# Patient Record
Sex: FEMALE | Race: WHITE | NOT HISPANIC OR LATINO | Employment: FULL TIME | URBAN - METROPOLITAN AREA
[De-identification: names, ages, dates, MRNs, and addresses within clinical notes are randomized per-mention and may not be internally consistent; named-entity substitution may affect disease eponyms.]

---

## 2023-01-25 ENCOUNTER — OFFICE VISIT (OUTPATIENT)
Dept: NEUROSURGERY | Facility: CLINIC | Age: 58
End: 2023-01-25
Payer: COMMERCIAL

## 2023-01-25 VITALS
HEIGHT: 62 IN | DIASTOLIC BLOOD PRESSURE: 70 MMHG | WEIGHT: 126.4 LBS | SYSTOLIC BLOOD PRESSURE: 124 MMHG | BODY MASS INDEX: 23.26 KG/M2

## 2023-01-25 DIAGNOSIS — M54.17 LUMBOSACRAL RADICULOPATHY AT S1: Primary | ICD-10-CM

## 2023-01-25 PROCEDURE — 99204 OFFICE O/P NEW MOD 45 MIN: CPT | Performed by: NEUROLOGICAL SURGERY

## 2023-01-25 RX ORDER — GABAPENTIN 300 MG/1
300 CAPSULE ORAL 3 TIMES DAILY
Qty: 90 CAPSULE | Refills: 1 | Status: SHIPPED | OUTPATIENT
Start: 2023-01-25 | End: 2023-03-28

## 2023-01-25 RX ORDER — ONDANSETRON 4 MG/1
TABLET, ORALLY DISINTEGRATING ORAL
COMMUNITY
Start: 2022-12-13

## 2023-01-25 RX ORDER — SERTRALINE HYDROCHLORIDE 25 MG/1
25 TABLET, FILM COATED ORAL DAILY
COMMUNITY
Start: 2023-01-05

## 2023-01-25 RX ORDER — CYCLOBENZAPRINE HCL 10 MG
10 TABLET ORAL NIGHTLY PRN
COMMUNITY
Start: 2023-01-06

## 2023-01-25 RX ORDER — DOXYCYCLINE HYCLATE 150 MG/1
1 TABLET ORAL DAILY
COMMUNITY
Start: 2022-12-27

## 2023-01-25 NOTE — PROGRESS NOTES
Subjective     Chief Complaint: Back pain    Patient ID: Trisha TELLEZ is a 57 y.o. female seen for consultation today at the request of  Kandy Monk MD    History of Present Illness    This is a 57-year-old woman who presents to my office with an approximately 3-4-month history of left buttock and leg dysesthetic pain.  Her symptoms started without antecedent trauma.  She has tried some home exercises consisting of stretches which have not helped all that much.  She is not taking any prescription medications for this problem.  She has not tried any physical therapy or pain management.    She does not have much in the way of medical comorbidities.  She does not smoke or drink alcohol.    The following portions of the patient's history were reviewed and updated as appropriate: allergies, current medications, past family history, past medical history, past social history, past surgical history and problem list.    Family history:   Family History   Problem Relation Age of Onset   • Hypertension Mother    • Arthritis Mother    • Hypertension Father    • Cancer Father    • Hypertension Brother    • Cancer Paternal Aunt    • Heart disease Maternal Grandmother    • Diabetes Maternal Grandmother    • Arthritis Maternal Grandmother        Social history:   Social History     Socioeconomic History   • Marital status:    Tobacco Use   • Smoking status: Former     Packs/day: 0.25     Types: Cigarettes     Quit date: 2016     Years since quittin.0   Substance and Sexual Activity   • Alcohol use: Yes     Alcohol/week: 5.0 standard drinks     Types: 5 Glasses of wine per week   • Drug use: Yes     Types: Marijuana     Comment: 1/day       Review of Systems   Constitutional: Positive for appetite change and fatigue.   Gastrointestinal: Positive for nausea.   Musculoskeletal: Positive for back pain and neck pain.   Skin: Positive for color change.   Allergic/Immunologic: Positive for environmental allergies.  "  Neurological: Positive for numbness.       Objective   Blood pressure 124/70, height 157.5 cm (62\"), weight 57.3 kg (126 lb 6.4 oz).  Body mass index is 23.12 kg/m².    Physical Exam  Vitals and nursing note reviewed.   Constitutional:       Appearance: She is well-developed. She is not toxic-appearing.   HENT:      Head: Normocephalic and atraumatic.      Right Ear: Hearing normal.      Left Ear: Hearing normal.      Nose: Nose normal.   Eyes:      General: Lids are normal.      Conjunctiva/sclera: Conjunctivae normal.      Pupils: Pupils are equal, round, and reactive to light.   Neck:      Vascular: No JVD.   Cardiovascular:      Rate and Rhythm: Normal rate and regular rhythm.      Pulses:           Radial pulses are 2+ on the right side and 2+ on the left side.   Pulmonary:      Effort: Pulmonary effort is normal. No respiratory distress.      Breath sounds: No stridor. No wheezing.   Musculoskeletal:      Cervical back: Normal range of motion.      Lumbar back: Negative right straight leg raise test and negative left straight leg raise test.      Comments: She does have some discomfort in her left lower back with the straight leg raise test at about 35 degrees , However there is no reproduction of the sciatica type pain.   Skin:     General: Skin is warm and dry.      Findings: No erythema or rash.   Neurological:      Mental Status: She is alert and oriented to person, place, and time.      GCS: GCS eye subscore is 4. GCS verbal subscore is 5. GCS motor subscore is 6.      Cranial Nerves: No cranial nerve deficit.      Motor: No abnormal muscle tone.      Deep Tendon Reflexes: Reflexes abnormal.      Reflex Scores:       Patellar reflexes are 1+ on the right side and 1+ on the left side.       Achilles reflexes are 0 on the right side and 0 on the left side.     Comments: Ankle jerks are absent bilaterally    Heel raise toe raise gait are performed unremarkably although she does have somewhat of an antalgic " casual gait.   Psychiatric:         Behavior: Behavior normal.         Thought Content: Thought content normal.         Judgment: Judgment normal.         Assessment & Plan     Independent Review of Radiographic Studies:      Available for my review is a MRI of the lumbar spine which was performed on 12/28/2022.  This demonstrates diffuse degenerative disc disease which for the most part is mild.  Lumbar lordosis is decreased, and there has been significant fatty replacement of the paraspinous musculature.  At L5-S1, there is a broad-based disc herniation which for the most part is paracentral, however there does appear to be a component of leftward a centricity and some contacting of the descending S1 nerve root.  This could reasonably be expected to be contributing to a left S1 radiculopathy.  There are some early Modic endplate changes noted at L5-S1 suggestive of a component of some chronic instability.  The central canal overall is capacious and I do not appreciate any other obvious foci of significant lateral recess or intraforaminal stenosis.    Medical Decision Making:      This is a 57-year-old woman with a subacute presentation of a left S1 radiculopathy.  She is a candidate for a left L5-S1 microdiscectomy and I discussed the risks and benefits of this procedure with her.  All questions were answered.  No guarantees were given or implied.  I reviewed the pertinent anatomy with the aid of a 3-dimensional model of the spine.    She opted for conservative treatment which I think is totally reasonable.  I have given her a prescription for some gabapentin and I will arrange for a left epidural steroid injection at L5-S1.  I have also referred her for physical therapy.  I will follow-up with her in about 6 weeks, or sooner if clinically indicated.    Diagnoses and all orders for this visit:    1. Lumbosacral radiculopathy at S1 (Primary)  -     Ambulatory Referral to Physical Therapy Evaluate and treat; Soft  Tissue Mobilizaton; Strengthening, Stretching  -     Ambulatory Referral to Pain Management  -     gabapentin (NEURONTIN) 300 MG capsule; Take 1 capsule by mouth 3 (Three) Times a Day.  Dispense: 90 capsule; Refill: 1        No follow-ups on file.           This document signed by YARED Lacey MD January 25, 2023 10:19 EST

## 2023-03-10 ENCOUNTER — OFFICE VISIT (OUTPATIENT)
Dept: NEUROSURGERY | Facility: CLINIC | Age: 58
End: 2023-03-10
Payer: COMMERCIAL

## 2023-03-10 VITALS
SYSTOLIC BLOOD PRESSURE: 118 MMHG | HEIGHT: 62 IN | BODY MASS INDEX: 23.37 KG/M2 | DIASTOLIC BLOOD PRESSURE: 64 MMHG | WEIGHT: 127 LBS

## 2023-03-10 DIAGNOSIS — M54.17 LUMBOSACRAL RADICULOPATHY AT S1: Primary | ICD-10-CM

## 2023-03-10 DIAGNOSIS — M51.27 HERNIATED NUCLEUS PULPOSUS, L5-S1, LEFT: ICD-10-CM

## 2023-03-10 PROCEDURE — 99213 OFFICE O/P EST LOW 20 MIN: CPT | Performed by: NEUROLOGICAL SURGERY

## 2023-03-10 NOTE — PROGRESS NOTES
"Subjective     Chief Complaint: S1 radiculopathy    Patient ID: Trisha TELLEZ is a 57 y.o. female is here today for follow-up.    History of Present Illness    This is a 57-year-old woman who I saw in consultation in January for a subacute presentation of an S1 radiculopathy.  I offered her surgical decompression, however she opted for conservative treatment which I felt was reasonable at that time.  She presents today for routine follow-up.    The following portions of the patient's history were reviewed and updated as appropriate: allergies, current medications, past family history, past medical history, past social history, past surgical history and problem list.    Family history:   Family History   Problem Relation Age of Onset   • Hypertension Mother    • Arthritis Mother    • Hypertension Father    • Cancer Father    • Hypertension Brother    • Cancer Paternal Aunt    • Heart disease Maternal Grandmother    • Diabetes Maternal Grandmother    • Arthritis Maternal Grandmother        Social history:   Social History     Socioeconomic History   • Marital status:    Tobacco Use   • Smoking status: Former     Packs/day: 0.25     Types: Cigarettes     Quit date:      Years since quittin.1   Substance and Sexual Activity   • Alcohol use: Yes     Alcohol/week: 5.0 standard drinks     Types: 5 Glasses of wine per week   • Drug use: Yes     Types: Marijuana     Comment: 1/day       Review of Systems   Constitutional: Positive for appetite change and fatigue.   Gastrointestinal: Positive for nausea.   Musculoskeletal: Positive for back pain and neck pain.   Skin: Positive for color change.   Allergic/Immunologic: Positive for environmental allergies.   Neurological: Positive for numbness.       Objective   Blood pressure 118/64, height 157.5 cm (62\"), weight 57.6 kg (127 lb).  Body mass index is 23.23 kg/m².    Physical Exam  Constitutional:       General: She is not in acute distress.     Appearance: " She is well-developed. She is not diaphoretic.   HENT:      Head: Normocephalic and atraumatic.   Pulmonary:      Effort: Pulmonary effort is normal.   Skin:     General: Skin is warm and dry.   Neurological:      Mental Status: She is alert and oriented to person, place, and time.      Cranial Nerves: No cranial nerve deficit.         Assessment & Plan     Independent Review of Radiographic Studies:      She has no new imaging for me to review    Medical Decision Making:      I reviewed the signs and symptoms of lumbosacral radiculopathy with her.  I directed her to contact my office with new or worsening symptoms.  I will follow-up with her in about 8 weeks, or sooner if clinically indicated.  For the time being, her symptoms are reasonably well controlled with physical therapy and gabapentin.  My intention is certainly not to keep her on gabapentin indefinitely, so we can discuss a protocol for weaning her down in the next 3 months assuming that her symptoms continue to improve.    Diagnoses and all orders for this visit:    1. Left S1 radiculopathy (Primary)    2. Herniated nucleus pulposus, L5-S1, left        No follow-ups on file.           This document signed by YARED Lacey MD March 10, 2023 10:17 EST

## 2023-03-26 DIAGNOSIS — M54.17 LUMBOSACRAL RADICULOPATHY AT S1: ICD-10-CM

## 2023-03-28 RX ORDER — GABAPENTIN 300 MG/1
CAPSULE ORAL
Qty: 90 CAPSULE | Refills: 3 | Status: SHIPPED | OUTPATIENT
Start: 2023-03-28

## 2023-03-28 NOTE — TELEPHONE ENCOUNTER
"Provider:  Abhijit  Surgery/Procedure:  ---  Surgery/Procedure Date:  --  Last visit:   Office Visit with Mina Lacey MD (03/10/2023)    Next visit: Appointment with Mina Lacey MD (05/12/2023)       Reason for call:    Requested Prescriptions     Pending Prescriptions Disp Refills   • gabapentin (NEURONTIN) 300 MG capsule [Pharmacy Med Name: GABAPENTIN 300MG CAPSULES] 90 capsule      Sig: TAKE 1 CAPSULE BY MOUTH THREE TIMES DAILY         \"For the time being, her symptoms are reasonably well controlled with physical therapy and gabapentin.  My intention is certainly not to keep her on gabapentin indefinitely, so we can discuss a protocol for weaning her down in the next 3 months assuming that her symptoms \"       Last ov note on 3/10/23^      03/16/2023 [id number] Hydrocodone Bitartrate/Ac 325MG/5MG Jayce, Griselda L [facility] 10 5 10 04 [place]  Date Written New/Refill Dosage Form Prescriber [facility]  03/16/2023 New TABS [facility] [place]  Pat ID Date Filled RX # Drug Name Prescriber Name Dispenser Name Qty Days MED PMT Rpt To  1 03/14/2023 [id number] Triazolam 0.25MG Jayce, Griselda L [facility] 3 2 04 [place]  Date Written New/Refill Dosage Form Prescriber [facility]  03/14/2023 New TABS [facility] [place]  Pat ID Date Filled RX # Drug Name Prescriber Name Dispenser Name Qty Days MED PMT Rpt To  1 02/24/2023 [id number] Gabapentin 300MG Mina Lacey [facility] 90 30 04 [place]  Date Written New/Refill Dosage Form Prescriber [facility]  01/25/2023 Refill CAPS [facility] [place]  Pat ID Date Filled RX # Drug Name Prescriber Name Dispenser Name Qty Days MED PMT Rpt To  1 01/25/2023 [id number] Gabapentin 300MG Mina Lacey [facility] [address] 30 04 [place]      "

## 2023-05-12 ENCOUNTER — OFFICE VISIT (OUTPATIENT)
Dept: NEUROSURGERY | Facility: CLINIC | Age: 58
End: 2023-05-12
Payer: COMMERCIAL

## 2023-05-12 VITALS — HEIGHT: 62 IN | WEIGHT: 126.4 LBS | TEMPERATURE: 97.7 F | BODY MASS INDEX: 23.26 KG/M2

## 2023-05-12 DIAGNOSIS — M54.17 LUMBOSACRAL RADICULOPATHY AT S1: Primary | ICD-10-CM

## 2023-05-12 DIAGNOSIS — M51.27 HERNIATED NUCLEUS PULPOSUS, L5-S1, LEFT: ICD-10-CM

## 2023-05-12 PROBLEM — M79.89 MASS OF SOFT TISSUE: Status: ACTIVE | Noted: 2022-05-12

## 2023-05-12 PROBLEM — G93.5 CHIARI MALFORMATION TYPE I: Status: ACTIVE | Noted: 2023-05-12

## 2023-05-12 PROBLEM — M85.80 OSTEOPENIA: Status: ACTIVE | Noted: 2023-05-12

## 2023-05-12 PROBLEM — J30.9 ALLERGIC RHINITIS: Status: ACTIVE | Noted: 2023-05-12

## 2023-05-12 PROBLEM — K21.9 GASTROESOPHAGEAL REFLUX DISEASE: Status: ACTIVE | Noted: 2023-05-12

## 2023-05-12 PROCEDURE — 99214 OFFICE O/P EST MOD 30 MIN: CPT | Performed by: NEUROLOGICAL SURGERY

## 2023-05-12 NOTE — H&P (VIEW-ONLY)
Subjective     Chief Complaint: Lumbosacral radiculopathy    Patient ID: Trisha TELLEZ is a 58 y.o. female is here today for follow-up.    History of Present Illness    This is a 58-year-old woman with a subacute presentation of S1 radiculopathy.  I offered her surgical intervention but she opted for conservative treatment.  She reports that her symptoms are being adequately controlled with gabapentin.  Furthermore, she reports that she does not really have time to undergo surgery at this point.    The following portions of the patient's history were reviewed and updated as appropriate: allergies, current medications, past family history, past medical history, past social history, past surgical history and problem list.    Family history:   Family History   Problem Relation Age of Onset   • Hypertension Mother    • Arthritis Mother    • Hypertension Father    • Cancer Father    • Hypertension Brother    • Cancer Paternal Aunt    • Heart disease Maternal Grandmother    • Diabetes Maternal Grandmother    • Arthritis Maternal Grandmother        Social history:   Social History     Socioeconomic History   • Marital status:    Tobacco Use   • Smoking status: Former     Packs/day: 0.25     Types: Cigarettes     Quit date:      Years since quittin.3   Substance and Sexual Activity   • Alcohol use: Yes     Alcohol/week: 5.0 standard drinks     Types: 5 Glasses of wine per week   • Drug use: Yes     Types: Marijuana     Comment: 1/day       Review of Systems   Constitutional: Negative for activity change, appetite change, chills, diaphoresis, fatigue, fever and unexpected weight change.   HENT: Negative for congestion, dental problem, drooling, ear discharge, ear pain, facial swelling, hearing loss, mouth sores, nosebleeds, postnasal drip, rhinorrhea, sinus pressure, sneezing, sore throat, tinnitus, trouble swallowing and voice change.    Eyes: Negative for photophobia, pain, discharge, redness, itching and  "visual disturbance.   Respiratory: Negative for apnea, cough, choking, chest tightness, shortness of breath, wheezing and stridor.    Cardiovascular: Negative for chest pain, palpitations and leg swelling.   Gastrointestinal: Negative for abdominal distention, abdominal pain, anal bleeding, blood in stool, constipation, diarrhea, nausea, rectal pain and vomiting.   Endocrine: Negative for cold intolerance, heat intolerance, polydipsia, polyphagia and polyuria.   Genitourinary: Negative for decreased urine volume, difficulty urinating, dysuria, enuresis, flank pain, frequency, genital sores, hematuria and urgency.   Musculoskeletal: Positive for back pain and joint swelling. Negative for arthralgias, gait problem, myalgias, neck pain and neck stiffness.   Skin: Negative for color change, pallor, rash and wound.   Allergic/Immunologic: Negative for environmental allergies, food allergies and immunocompromised state.   Neurological: Negative for dizziness, tremors, seizures, syncope, facial asymmetry, speech difficulty, weakness, light-headedness, numbness and headaches.   Hematological: Negative for adenopathy. Does not bruise/bleed easily.   Psychiatric/Behavioral: Negative for agitation, behavioral problems, confusion, decreased concentration, dysphoric mood, hallucinations, self-injury, sleep disturbance and suicidal ideas. The patient is not nervous/anxious and is not hyperactive.    All other systems reviewed and are negative.      Objective   Temperature 97.7 °F (36.5 °C), temperature source Infrared, height 157.5 cm (62\"), weight 57.3 kg (126 lb 6.4 oz).  Body mass index is 23.12 kg/m².    Physical Exam  Constitutional:       General: She is not in acute distress.     Appearance: She is well-developed. She is not diaphoretic.   HENT:      Head: Normocephalic and atraumatic.   Pulmonary:      Effort: Pulmonary effort is normal.   Skin:     General: Skin is warm and dry.   Neurological:      Mental Status: She is " alert and oriented to person, place, and time.      Cranial Nerves: No cranial nerve deficit.         Assessment & Plan     Independent Review of Radiographic Studies:      She has no new imaging for me to review.  I did we review the MRI of her lumbar spine from December 2022.  There is an extruded fragment at L5-S1 on the left side which is compressing the descending S1 nerve root.  There is evidence of a prior laminotomy on the right side as well.    Medical Decision Making:      For the time being her symptoms are reasonably well controlled with Neurontin.  She is not interested in pursuing surgery at this point so we can maintain her for a while longer.  If at any point she decides that she would like to proceed with surgery I have obtained informed consent with her on multiple occasions at this point.  I will have her follow-up with me at the end of the summer if she would like and we can discuss what further management options she might have, but it sounds like for the time being she is content to manage her pain symptomatically with her current dose of gabapentin.  I will follow-up with her as needed.    Diagnoses and all orders for this visit:    1. Lumbosacral radiculopathy at S1 (Primary)    2. Herniated nucleus pulposus, L5-S1, left        Return if symptoms worsen or fail to improve.           This document signed by YARED Lacey MD May 12, 2023 14:37 EDT

## 2023-05-23 ENCOUNTER — PRE-ADMISSION TESTING (OUTPATIENT)
Dept: PREADMISSION TESTING | Facility: HOSPITAL | Age: 58
End: 2023-05-23
Payer: COMMERCIAL

## 2023-05-23 VITALS — WEIGHT: 123.68 LBS | BODY MASS INDEX: 22.76 KG/M2 | HEIGHT: 62 IN

## 2023-05-23 DIAGNOSIS — M54.17 LUMBOSACRAL RADICULOPATHY AT S1: ICD-10-CM

## 2023-05-23 DIAGNOSIS — M51.27 HERNIATED NUCLEUS PULPOSUS, L5-S1, LEFT: ICD-10-CM

## 2023-05-23 LAB
ANION GAP SERPL CALCULATED.3IONS-SCNC: 9 MMOL/L (ref 5–15)
APTT PPP: 30.2 SECONDS (ref 22–39)
BILIRUB UR QL STRIP: NEGATIVE
BUN SERPL-MCNC: 15 MG/DL (ref 6–20)
BUN/CREAT SERPL: 19.5 (ref 7–25)
CALCIUM SPEC-SCNC: 10.6 MG/DL (ref 8.6–10.5)
CHLORIDE SERPL-SCNC: 106 MMOL/L (ref 98–107)
CLARITY UR: CLEAR
CO2 SERPL-SCNC: 30 MMOL/L (ref 22–29)
COLOR UR: YELLOW
CREAT SERPL-MCNC: 0.77 MG/DL (ref 0.57–1)
DEPRECATED RDW RBC AUTO: 42.4 FL (ref 37–54)
EGFRCR SERPLBLD CKD-EPI 2021: 90.1 ML/MIN/1.73
ERYTHROCYTE [DISTWIDTH] IN BLOOD BY AUTOMATED COUNT: 12.5 % (ref 12.3–15.4)
GLUCOSE SERPL-MCNC: 101 MG/DL (ref 65–99)
GLUCOSE UR STRIP-MCNC: NEGATIVE MG/DL
HBA1C MFR BLD: 5.5 % (ref 4.8–5.6)
HCT VFR BLD AUTO: 42.5 % (ref 34–46.6)
HGB BLD-MCNC: 13.8 G/DL (ref 12–15.9)
HGB UR QL STRIP.AUTO: NEGATIVE
INR PPP: 0.9 (ref 0.89–1.12)
KETONES UR QL STRIP: NEGATIVE
LEUKOCYTE ESTERASE UR QL STRIP.AUTO: NEGATIVE
MCH RBC QN AUTO: 30 PG (ref 26.6–33)
MCHC RBC AUTO-ENTMCNC: 32.5 G/DL (ref 31.5–35.7)
MCV RBC AUTO: 92.4 FL (ref 79–97)
NITRITE UR QL STRIP: NEGATIVE
PH UR STRIP.AUTO: 8 [PH] (ref 5–8)
PLATELET # BLD AUTO: 328 10*3/MM3 (ref 140–450)
PMV BLD AUTO: 9.7 FL (ref 6–12)
POTASSIUM SERPL-SCNC: 5.1 MMOL/L (ref 3.5–5.2)
PROT UR QL STRIP: NEGATIVE
PROTHROMBIN TIME: 12.3 SECONDS (ref 12.2–14.5)
RBC # BLD AUTO: 4.6 10*6/MM3 (ref 3.77–5.28)
SODIUM SERPL-SCNC: 145 MMOL/L (ref 136–145)
SP GR UR STRIP: 1.01 (ref 1–1.03)
UROBILINOGEN UR QL STRIP: NORMAL
WBC NRBC COR # BLD: 6.01 10*3/MM3 (ref 3.4–10.8)

## 2023-05-23 PROCEDURE — 85027 COMPLETE CBC AUTOMATED: CPT

## 2023-05-23 PROCEDURE — 80048 BASIC METABOLIC PNL TOTAL CA: CPT

## 2023-05-23 PROCEDURE — 36415 COLL VENOUS BLD VENIPUNCTURE: CPT

## 2023-05-23 PROCEDURE — 85730 THROMBOPLASTIN TIME PARTIAL: CPT

## 2023-05-23 PROCEDURE — 83036 HEMOGLOBIN GLYCOSYLATED A1C: CPT

## 2023-05-23 PROCEDURE — 81003 URINALYSIS AUTO W/O SCOPE: CPT

## 2023-05-23 PROCEDURE — 85610 PROTHROMBIN TIME: CPT

## 2023-05-23 PROCEDURE — 87081 CULTURE SCREEN ONLY: CPT

## 2023-05-23 RX ORDER — CETIRIZINE HYDROCHLORIDE 10 MG/1
10 TABLET ORAL DAILY
COMMUNITY

## 2023-05-23 RX ORDER — OMEPRAZOLE 20 MG/1
20 CAPSULE, DELAYED RELEASE ORAL DAILY
COMMUNITY

## 2023-05-23 NOTE — PAT
An arrival time for procedure was not provided during PAT visit. If patient had any questions or concerns about their arrival time, they were instructed to contact their surgeon/physician.  Additionally, if the patient referred to an arrival time that was acquired from their my chart account, patient was encouraged to verify that time with their surgeon/physician. Arrival times are NOT provided in Pre Admission Testing Department.    Patient denies any current skin issues.     Bactroban (if prescribed) and Chlorhexidine Prescription prescribed by physician before PAT visit.  Verified with patient that medication(s) were picked up from their pharmacy.  Written instructions given to patient during PAT visit.  Patient/family also instructed to complete skin prep checklist and return the checklist on the day of surgery to preoperative staff.  Patient/family verbalized understanding.    Patient to apply Chlorhexadine wipes  to surgical area (as instructed) the night before procedure and the AM of procedure. Wipes provided.    Patient viewed general PAT education video as instructed in their preoperative information received from their surgeon.  Patient stated the general PAT education video was viewed in its entirety and survey completed.  Copies of PAT general education handouts (Incentive Spirometry, Meds to Beds Program, Patient Belongings, Pre-op skin preparation instructions, Blood Glucose testing, Visitor policy, Surgery FAQ, Code H) distributed to patient if not printed. Education related to the PAT pass and skin preparation for surgery (if applicable) completed in PAT as a reinforcement to PAT education video. Patient instructed to return PAT pass provided today as well as completed skin preparation sheet (if applicable) on the day of procedure.     Additionally if patient had not viewed video yet but intended to view it at home or in our waiting area, then referred them to the handout with QR code/link provided  during PAT visit.  Instructed patient to complete survey after viewing the video in its entirety.  Encouraged patient/family to read PAT general education handouts thoroughly and notify PAT staff with any questions or concerns. Patient verbalized understanding of all information and priority content.

## 2023-05-24 LAB — MRSA SPEC QL CULT: NORMAL

## 2023-05-29 ENCOUNTER — ANESTHESIA EVENT (OUTPATIENT)
Dept: PERIOP | Facility: HOSPITAL | Age: 58
End: 2023-05-29
Payer: COMMERCIAL

## 2023-05-29 RX ORDER — SODIUM CHLORIDE 9 MG/ML
40 INJECTION, SOLUTION INTRAVENOUS AS NEEDED
Status: CANCELLED | OUTPATIENT
Start: 2023-05-29

## 2023-05-29 RX ORDER — FAMOTIDINE 10 MG/ML
20 INJECTION, SOLUTION INTRAVENOUS ONCE
Status: CANCELLED | OUTPATIENT
Start: 2023-05-29 | End: 2023-05-29

## 2023-05-29 RX ORDER — SODIUM CHLORIDE 0.9 % (FLUSH) 0.9 %
10 SYRINGE (ML) INJECTION AS NEEDED
Status: CANCELLED | OUTPATIENT
Start: 2023-05-29

## 2023-05-29 RX ORDER — SODIUM CHLORIDE 0.9 % (FLUSH) 0.9 %
10 SYRINGE (ML) INJECTION EVERY 12 HOURS SCHEDULED
Status: CANCELLED | OUTPATIENT
Start: 2023-05-29

## 2023-05-30 ENCOUNTER — APPOINTMENT (OUTPATIENT)
Dept: GENERAL RADIOLOGY | Facility: HOSPITAL | Age: 58
End: 2023-05-30
Payer: COMMERCIAL

## 2023-05-30 ENCOUNTER — HOSPITAL ENCOUNTER (OUTPATIENT)
Facility: HOSPITAL | Age: 58
Discharge: HOME | End: 2023-05-31
Attending: NEUROLOGICAL SURGERY | Admitting: NEUROLOGICAL SURGERY
Payer: COMMERCIAL

## 2023-05-30 ENCOUNTER — ANESTHESIA (OUTPATIENT)
Dept: PERIOP | Facility: HOSPITAL | Age: 58
End: 2023-05-30
Payer: COMMERCIAL

## 2023-05-30 DIAGNOSIS — M54.17 LUMBOSACRAL RADICULOPATHY AT S1: ICD-10-CM

## 2023-05-30 DIAGNOSIS — M51.27 HERNIATED NUCLEUS PULPOSUS, L5-S1, LEFT: ICD-10-CM

## 2023-05-30 PROCEDURE — 25010000002 METHYLPREDNISOLONE PER 40 MG: Performed by: NEUROLOGICAL SURGERY

## 2023-05-30 PROCEDURE — 25010000002 ONDANSETRON PER 1 MG: Performed by: NURSE ANESTHETIST, CERTIFIED REGISTERED

## 2023-05-30 PROCEDURE — 63047 LAM FACETEC & FORAMOT LUMBAR: CPT | Performed by: PHYSICIAN ASSISTANT

## 2023-05-30 PROCEDURE — 25010000002 NEOSTIGMINE 10 MG/10ML SOLUTION: Performed by: NURSE ANESTHETIST, CERTIFIED REGISTERED

## 2023-05-30 PROCEDURE — 25010000002 PROPOFOL 10 MG/ML EMULSION: Performed by: NURSE ANESTHETIST, CERTIFIED REGISTERED

## 2023-05-30 PROCEDURE — 25010000002 FENTANYL CITRATE (PF) 100 MCG/2ML SOLUTION: Performed by: NURSE ANESTHETIST, CERTIFIED REGISTERED

## 2023-05-30 PROCEDURE — 63047 LAM FACETEC & FORAMOT LUMBAR: CPT | Performed by: NEUROLOGICAL SURGERY

## 2023-05-30 PROCEDURE — 25010000002 CEFAZOLIN IN DEXTROSE 2-4 GM/100ML-% SOLUTION: Performed by: NEUROLOGICAL SURGERY

## 2023-05-30 PROCEDURE — P9612 CATHETERIZE FOR URINE SPEC: HCPCS

## 2023-05-30 PROCEDURE — 25010000002 DEXAMETHASONE SODIUM PHOSPHATE 100 MG/10ML SOLUTION: Performed by: NEUROLOGICAL SURGERY

## 2023-05-30 PROCEDURE — 76000 FLUOROSCOPY <1 HR PHYS/QHP: CPT

## 2023-05-30 DEVICE — HEMOST ABS SURGIFOAM SZ100 8X12 10MM: Type: IMPLANTABLE DEVICE | Site: SPINE LUMBAR | Status: FUNCTIONAL

## 2023-05-30 DEVICE — ADHERUS AUTOSPRAY EXTENDED TIP (ET) DURAL SEALANT IS A STERILE, SINGLE-USE, ELECTROMECHANICAL, BATTERY OPERATED DEVICE WITH INTERNAL SYSTEM COMPONENTS THAT PROVIDE AIR FLOW TO AID IN THE DELIVERY OF A SYNTHETIC, ABSORBABLE, TWO-COMPONENT HYDROGEL SEALANT SYSTEM AND ALLOW DELIVERY TO BE INTERRUPTED WITHOUT CLOGGING.
Type: IMPLANTABLE DEVICE | Site: SPINE LUMBAR | Status: FUNCTIONAL
Brand: ADHERUS AUTOSPRAY ET DURAL SEALANT

## 2023-05-30 DEVICE — FLOSEAL HEMOSTATIC MATRIX, 10ML
Type: IMPLANTABLE DEVICE | Site: SPINE LUMBAR | Status: FUNCTIONAL
Brand: FLOSEAL HEMOSTATIC MATRIX

## 2023-05-30 RX ORDER — POLYETHYLENE GLYCOL 3350 17 G/17G
17 POWDER, FOR SOLUTION ORAL DAILY PRN
Status: DISCONTINUED | OUTPATIENT
Start: 2023-05-30 | End: 2023-05-31 | Stop reason: HOSPADM

## 2023-05-30 RX ORDER — NEOSTIGMINE METHYLSULFATE 1 MG/ML
INJECTION, SOLUTION INTRAVENOUS AS NEEDED
Status: DISCONTINUED | OUTPATIENT
Start: 2023-05-30 | End: 2023-05-30 | Stop reason: SURG

## 2023-05-30 RX ORDER — PROPOFOL 10 MG/ML
VIAL (ML) INTRAVENOUS AS NEEDED
Status: DISCONTINUED | OUTPATIENT
Start: 2023-05-30 | End: 2023-05-30 | Stop reason: SURG

## 2023-05-30 RX ORDER — CEFAZOLIN SODIUM 2 G/100ML
2 INJECTION, SOLUTION INTRAVENOUS ONCE
Status: COMPLETED | OUTPATIENT
Start: 2023-05-30 | End: 2023-05-30

## 2023-05-30 RX ORDER — SCOLOPAMINE TRANSDERMAL SYSTEM 1 MG/1
1 PATCH, EXTENDED RELEASE TRANSDERMAL
Status: DISCONTINUED | OUTPATIENT
Start: 2023-05-30 | End: 2023-05-30 | Stop reason: HOSPADM

## 2023-05-30 RX ORDER — SERTRALINE HYDROCHLORIDE 25 MG/1
25 TABLET, FILM COATED ORAL DAILY
Status: DISCONTINUED | OUTPATIENT
Start: 2023-05-30 | End: 2023-05-31 | Stop reason: HOSPADM

## 2023-05-30 RX ORDER — ONDANSETRON 2 MG/ML
INJECTION INTRAMUSCULAR; INTRAVENOUS AS NEEDED
Status: DISCONTINUED | OUTPATIENT
Start: 2023-05-30 | End: 2023-05-30 | Stop reason: SURG

## 2023-05-30 RX ORDER — PHENYLEPHRINE HCL IN 0.9% NACL 1 MG/10 ML
SYRINGE (ML) INTRAVENOUS AS NEEDED
Status: DISCONTINUED | OUTPATIENT
Start: 2023-05-30 | End: 2023-05-30 | Stop reason: SURG

## 2023-05-30 RX ORDER — MAGNESIUM HYDROXIDE 1200 MG/15ML
LIQUID ORAL AS NEEDED
Status: DISCONTINUED | OUTPATIENT
Start: 2023-05-30 | End: 2023-05-30 | Stop reason: HOSPADM

## 2023-05-30 RX ORDER — GLYCOPYRROLATE 0.2 MG/ML
INJECTION INTRAMUSCULAR; INTRAVENOUS AS NEEDED
Status: DISCONTINUED | OUTPATIENT
Start: 2023-05-30 | End: 2023-05-30 | Stop reason: SURG

## 2023-05-30 RX ORDER — ACETAMINOPHEN 325 MG/1
650 TABLET ORAL EVERY 4 HOURS PRN
Status: DISCONTINUED | OUTPATIENT
Start: 2023-05-30 | End: 2023-05-31 | Stop reason: HOSPADM

## 2023-05-30 RX ORDER — FENTANYL CITRATE 50 UG/ML
INJECTION, SOLUTION INTRAMUSCULAR; INTRAVENOUS AS NEEDED
Status: DISCONTINUED | OUTPATIENT
Start: 2023-05-30 | End: 2023-05-30 | Stop reason: SURG

## 2023-05-30 RX ORDER — FAMOTIDINE 20 MG/1
20 TABLET, FILM COATED ORAL ONCE
Status: COMPLETED | OUTPATIENT
Start: 2023-05-30 | End: 2023-05-30

## 2023-05-30 RX ORDER — GABAPENTIN 300 MG/1
300 CAPSULE ORAL 3 TIMES DAILY
Status: DISCONTINUED | OUTPATIENT
Start: 2023-05-30 | End: 2023-05-31 | Stop reason: HOSPADM

## 2023-05-30 RX ORDER — CYCLOBENZAPRINE HCL 10 MG
10 TABLET ORAL NIGHTLY PRN
Status: DISCONTINUED | OUTPATIENT
Start: 2023-05-30 | End: 2023-05-31 | Stop reason: HOSPADM

## 2023-05-30 RX ORDER — SODIUM CHLORIDE 0.9 % (FLUSH) 0.9 %
10 SYRINGE (ML) INJECTION AS NEEDED
Status: DISCONTINUED | OUTPATIENT
Start: 2023-05-30 | End: 2023-05-31 | Stop reason: HOSPADM

## 2023-05-30 RX ORDER — LIDOCAINE HYDROCHLORIDE 10 MG/ML
INJECTION, SOLUTION EPIDURAL; INFILTRATION; INTRACAUDAL; PERINEURAL AS NEEDED
Status: DISCONTINUED | OUTPATIENT
Start: 2023-05-30 | End: 2023-05-30 | Stop reason: SURG

## 2023-05-30 RX ORDER — MIDAZOLAM HYDROCHLORIDE 1 MG/ML
1 INJECTION INTRAMUSCULAR; INTRAVENOUS
Status: DISCONTINUED | OUTPATIENT
Start: 2023-05-30 | End: 2023-05-30 | Stop reason: HOSPADM

## 2023-05-30 RX ORDER — FENTANYL CITRATE 50 UG/ML
50 INJECTION, SOLUTION INTRAMUSCULAR; INTRAVENOUS
Status: DISCONTINUED | OUTPATIENT
Start: 2023-05-30 | End: 2023-05-30 | Stop reason: HOSPADM

## 2023-05-30 RX ORDER — EPHEDRINE SULFATE 50 MG/ML
INJECTION INTRAVENOUS AS NEEDED
Status: DISCONTINUED | OUTPATIENT
Start: 2023-05-30 | End: 2023-05-30 | Stop reason: SURG

## 2023-05-30 RX ORDER — SODIUM CHLORIDE, SODIUM LACTATE, POTASSIUM CHLORIDE, CALCIUM CHLORIDE 600; 310; 30; 20 MG/100ML; MG/100ML; MG/100ML; MG/100ML
9 INJECTION, SOLUTION INTRAVENOUS CONTINUOUS
Status: DISCONTINUED | OUTPATIENT
Start: 2023-05-30 | End: 2023-05-31 | Stop reason: HOSPADM

## 2023-05-30 RX ORDER — DOCUSATE SODIUM 100 MG/1
100 CAPSULE, LIQUID FILLED ORAL 2 TIMES DAILY PRN
Status: DISCONTINUED | OUTPATIENT
Start: 2023-05-30 | End: 2023-05-31 | Stop reason: HOSPADM

## 2023-05-30 RX ORDER — CETIRIZINE HYDROCHLORIDE 10 MG/1
10 TABLET ORAL DAILY
Status: DISCONTINUED | OUTPATIENT
Start: 2023-05-30 | End: 2023-05-31 | Stop reason: HOSPADM

## 2023-05-30 RX ORDER — SODIUM CHLORIDE 0.9 % (FLUSH) 0.9 %
3 SYRINGE (ML) INJECTION EVERY 12 HOURS SCHEDULED
Status: DISCONTINUED | OUTPATIENT
Start: 2023-05-30 | End: 2023-05-31 | Stop reason: HOSPADM

## 2023-05-30 RX ORDER — DOXYCYCLINE HYCLATE 75 MG/1
150 TABLET ORAL NIGHTLY
Status: DISCONTINUED | OUTPATIENT
Start: 2023-05-30 | End: 2023-05-30

## 2023-05-30 RX ORDER — ONDANSETRON 4 MG/1
4 TABLET, FILM COATED ORAL EVERY 6 HOURS PRN
Status: DISCONTINUED | OUTPATIENT
Start: 2023-05-30 | End: 2023-05-31 | Stop reason: HOSPADM

## 2023-05-30 RX ORDER — SODIUM CHLORIDE 9 MG/ML
40 INJECTION, SOLUTION INTRAVENOUS AS NEEDED
Status: DISCONTINUED | OUTPATIENT
Start: 2023-05-30 | End: 2023-05-31 | Stop reason: HOSPADM

## 2023-05-30 RX ORDER — LIDOCAINE HYDROCHLORIDE 10 MG/ML
0.5 INJECTION, SOLUTION EPIDURAL; INFILTRATION; INTRACAUDAL; PERINEURAL ONCE AS NEEDED
Status: COMPLETED | OUTPATIENT
Start: 2023-05-30 | End: 2023-05-30

## 2023-05-30 RX ORDER — AMOXICILLIN 250 MG
1 CAPSULE ORAL NIGHTLY PRN
Status: DISCONTINUED | OUTPATIENT
Start: 2023-05-30 | End: 2023-05-31 | Stop reason: HOSPADM

## 2023-05-30 RX ORDER — IBUPROFEN 400 MG/1
200 TABLET ORAL EVERY 4 HOURS PRN
Status: DISCONTINUED | OUTPATIENT
Start: 2023-05-30 | End: 2023-05-31 | Stop reason: HOSPADM

## 2023-05-30 RX ORDER — METHYLPREDNISOLONE ACETATE 40 MG/ML
INJECTION, SUSPENSION INTRA-ARTICULAR; INTRALESIONAL; INTRAMUSCULAR; SOFT TISSUE AS NEEDED
Status: DISCONTINUED | OUTPATIENT
Start: 2023-05-30 | End: 2023-05-30 | Stop reason: HOSPADM

## 2023-05-30 RX ORDER — TRAMADOL HYDROCHLORIDE 50 MG/1
50 TABLET ORAL EVERY 4 HOURS PRN
Status: DISCONTINUED | OUTPATIENT
Start: 2023-05-30 | End: 2023-05-31 | Stop reason: HOSPADM

## 2023-05-30 RX ORDER — PANTOPRAZOLE SODIUM 40 MG/1
40 TABLET, DELAYED RELEASE ORAL
Status: DISCONTINUED | OUTPATIENT
Start: 2023-05-30 | End: 2023-05-31 | Stop reason: HOSPADM

## 2023-05-30 RX ORDER — ROCURONIUM BROMIDE 10 MG/ML
INJECTION, SOLUTION INTRAVENOUS AS NEEDED
Status: DISCONTINUED | OUTPATIENT
Start: 2023-05-30 | End: 2023-05-30 | Stop reason: SURG

## 2023-05-30 RX ORDER — LIDOCAINE HYDROCHLORIDE AND EPINEPHRINE 5; 5 MG/ML; UG/ML
INJECTION, SOLUTION INFILTRATION; PERINEURAL AS NEEDED
Status: DISCONTINUED | OUTPATIENT
Start: 2023-05-30 | End: 2023-05-30 | Stop reason: HOSPADM

## 2023-05-30 RX ADMIN — SCOPOLAMINE 1 PATCH: 1.5 PATCH, EXTENDED RELEASE TRANSDERMAL at 07:02

## 2023-05-30 RX ADMIN — GABAPENTIN 300 MG: 300 CAPSULE ORAL at 22:06

## 2023-05-30 RX ADMIN — SERTRALINE HYDROCHLORIDE 25 MG: 25 TABLET ORAL at 22:12

## 2023-05-30 RX ADMIN — TRAMADOL HYDROCHLORIDE 50 MG: 50 TABLET, COATED ORAL at 22:06

## 2023-05-30 RX ADMIN — EPHEDRINE SULFATE 10 MG: 50 INJECTION INTRAVENOUS at 08:25

## 2023-05-30 RX ADMIN — TRAMADOL HYDROCHLORIDE 50 MG: 50 TABLET, COATED ORAL at 12:38

## 2023-05-30 RX ADMIN — GLYCOPYRROLATE 0.2 MG: 0.4 INJECTION INTRAMUSCULAR; INTRAVENOUS at 08:29

## 2023-05-30 RX ADMIN — ROCURONIUM BROMIDE 50 MG: 10 SOLUTION INTRAVENOUS at 07:35

## 2023-05-30 RX ADMIN — ONDANSETRON 4 MG: 2 INJECTION INTRAMUSCULAR; INTRAVENOUS at 08:30

## 2023-05-30 RX ADMIN — SODIUM CHLORIDE, POTASSIUM CHLORIDE, SODIUM LACTATE AND CALCIUM CHLORIDE: 600; 310; 30; 20 INJECTION, SOLUTION INTRAVENOUS at 08:31

## 2023-05-30 RX ADMIN — GABAPENTIN 300 MG: 300 CAPSULE ORAL at 11:23

## 2023-05-30 RX ADMIN — NEOSTIGMINE 3 MG: 1 INJECTION INTRAVENOUS at 08:37

## 2023-05-30 RX ADMIN — GABAPENTIN 300 MG: 300 CAPSULE ORAL at 15:48

## 2023-05-30 RX ADMIN — CYCLOBENZAPRINE 10 MG: 10 TABLET, FILM COATED ORAL at 12:38

## 2023-05-30 RX ADMIN — CETIRIZINE HYDROCHLORIDE 10 MG: 10 TABLET, FILM COATED ORAL at 22:12

## 2023-05-30 RX ADMIN — FENTANYL CITRATE 100 MCG: 50 INJECTION, SOLUTION INTRAMUSCULAR; INTRAVENOUS at 07:35

## 2023-05-30 RX ADMIN — EPHEDRINE SULFATE 10 MG: 50 INJECTION INTRAVENOUS at 08:22

## 2023-05-30 RX ADMIN — LIDOCAINE HYDROCHLORIDE 0.5 ML: 10 INJECTION, SOLUTION EPIDURAL; INFILTRATION; INTRACAUDAL; PERINEURAL at 07:03

## 2023-05-30 RX ADMIN — FAMOTIDINE 20 MG: 20 TABLET ORAL at 07:02

## 2023-05-30 RX ADMIN — SODIUM CHLORIDE, POTASSIUM CHLORIDE, SODIUM LACTATE AND CALCIUM CHLORIDE 9 ML/HR: 600; 310; 30; 20 INJECTION, SOLUTION INTRAVENOUS at 07:06

## 2023-05-30 RX ADMIN — LIDOCAINE HYDROCHLORIDE 50 MG: 10 INJECTION, SOLUTION EPIDURAL; INFILTRATION; INTRACAUDAL; PERINEURAL at 07:35

## 2023-05-30 RX ADMIN — EPHEDRINE SULFATE 5 MG: 50 INJECTION INTRAVENOUS at 08:18

## 2023-05-30 RX ADMIN — Medication 100 MCG: at 08:30

## 2023-05-30 RX ADMIN — Medication 3 ML: at 22:07

## 2023-05-30 RX ADMIN — CEFAZOLIN SODIUM 2 G: 2 INJECTION, SOLUTION INTRAVENOUS at 07:30

## 2023-05-30 RX ADMIN — PROPOFOL 200 MG: 10 INJECTION, EMULSION INTRAVENOUS at 07:35

## 2023-05-30 RX ADMIN — IBUPROFEN 200 MG: 400 TABLET ORAL at 15:49

## 2023-05-30 RX ADMIN — IBUPROFEN 200 MG: 400 TABLET ORAL at 11:23

## 2023-05-30 RX ADMIN — PANTOPRAZOLE SODIUM 40 MG: 40 TABLET, DELAYED RELEASE ORAL at 11:23

## 2023-05-30 RX ADMIN — GLYCOPYRROLATE 0.2 MG: 0.4 INJECTION INTRAMUSCULAR; INTRAVENOUS at 08:37

## 2023-05-30 NOTE — OP NOTE
Pre-operative diagnosis: Left S1 radiculopathy  Post-operative diagnosis: Same    Procedures performed:  1.  Left L5 laminotomy, left L5-S1 lateral recess decompression, left L5-S1 foraminotomy, mesial facetectomy    Indication for procedure: This is a 58-year-old woman who presented to my office with chief complaints of left S1 radiculopathy.  She had a history of a prior right L5-S1 microdecompression.  She failed conservative treatment.  A preoperative MRI demonstrated compression of the left S1 nerve root.  Her symptoms and her MRI findings correlated nicely.  Surgical intervention is indicated.    Informed consent for a lumbar decompression was obtained from the patient. She acknowledges a risk of nerve root injury, paralysis, loss of sensation, bladder and/or bowel incontinence, permanent neurological impairment, bleeding, infection, CSF leak, iatrogenic instability, failure of benefit of the surgery, or need for additional procedures. All questions were answered. No guarantees were given or implied. The patient elects to proceed.    She acknowledges that she is at increased risk of periprocedural complications due to her prior surgery at this level.    Procedure in detail: The patient was identified in the pre-operative holding area and brought to the operating suite where she underwent the uneventful induction of general, endotracheal anesthesia. Venodynes were placed by the nursing staff. The patient was then rotated to the prone position on the Sergio frame. Pressure points were inspected and appropriately padded. Her lumbar spine was then shaved, prepped and draped in the usual sterile fashion. A time out was performed. Intravenous antibiotics were then administered. Lateral fluoroscopy was then used to localize the L4/L5 level. A vertically-oriented skin incision over the operative level was marked out one fingerbreadth to the left of midline and the skin was anesthetized.    The skin and fascia were  then sharply opened. After sequentially dilating under fluoroscopic visualization, a 4 x 22 mm Medtronic tube was placed over the disc space. PA and lateral fluoroscopy confirmed satisfactory placement of the tube and again confirmed the operative level. The operating microscope was brought in. This was required for microdissection of the nerve root and epidural venous plexus. A laminotomy was then carried out using the high speed drill and completed with the Kerrison rongeurs. The yellow ligament was bluntly opened using a blunt nerve hook.  As I was working along the medial and superior aspects of the laminotomy, I encountered scar tissue which was densely adherent to the dura.  I did encounter some scarred arachnoid, but no CSF was seen.  I turned my attention towards the inferior and lateral aspect of the decompression.  Again, there was excessive scar tissue which was presumably due to the patient's prior surgery even though it was on the other side.  Dissection was very tedious at this point although I was able to ultimately identify the descending nerve root.  I elected not to try to mobilize the nerve root free from the disc, but was able to achieve a satisfactory decompression of the mesial facet and neural foramen.  The S1 nerve root was free of dorsal decompression and so I terminated the procedure.    Epidural oozing was controlled using thrombin foam.  1 cc of Depo-Medrol was applied to the exposed nerve root.  Adherus dural sealant was then applied within the laminotomy bed.    The fascia and skin were then tightly closed in layers.  Glue and a sterile dressing were then applied.    Sponge, instrument and needle counts were correct at the end of the case.    Deepthi Mishra, physician assistant was responsible for performing the following activities: Retraction, Suction, Irrigation, Suturing and Closing and their skilled assistance was necessary for the success of this case.

## 2023-05-30 NOTE — ANESTHESIA POSTPROCEDURE EVALUATION
Patient: Trisha Bates    Procedure Summary     Date: 05/30/23 Room / Location:  [facility] [address] [place] [address] [facility]    Anesthesia Start: 0730 Anesthesia Stop: 0858    Procedure: Left L5-S1 microdiscectomy (Left: Spine Lumbar) Diagnosis:       Lumbosacral radiculopathy at S1      Herniated nucleus pulposus, L5-S1, left      (Lumbosacral radiculopathy at S1 [M54.17])      (Herniated nucleus pulposus, L5-S1, left [M51.27])    Surgeons: Mina Lacey MD Provider: Martin Jay MD    Anesthesia Type: general ASA Status: 2          Anesthesia Type: general    Vitals  Vitals Value Taken Time   BP 93/58 05/30/23 0855   Temp 97.3    Pulse 62 05/30/23 0857   Resp 9    SpO2 98 % 05/30/23 0857   Vitals shown include unvalidated device data.        Post Anesthesia Care and Evaluation    Patient location during evaluation: PACU  Patient participation: waiting for patient participation  Level of consciousness: sleepy but conscious  Pain score: 0  Pain management: adequate    Airway patency: patent  Anesthetic complications: No anesthetic complications  PONV Status: none  Cardiovascular status: hemodynamically stable and acceptable  Respiratory status: nonlabored ventilation, acceptable and nasal cannula  Hydration status: acceptable

## 2023-05-30 NOTE — PLAN OF CARE
Goal Outcome Evaluation:   VSS. No numbness/ tingling noted. Lumbar incision CDI- no drainage noted. HOB may be elevated to 10 degrees per Dr Lacey. In & out catheterization needed upon transfer from PACU- 1725 ml drained. Patient instructed on how to logroll- patient repositioned on own. Daughter at bedside.

## 2023-05-30 NOTE — ANESTHESIA PREPROCEDURE EVALUATION
Anesthesia Evaluation     Patient summary reviewed and Nursing notes reviewed   history of anesthetic complications: PONV               Airway   Mallampati: II  TM distance: >3 FB  Neck ROM: full  No difficulty expected  Dental - normal exam     Comment: CROWNS AND CAPS    Pulmonary - normal exam   (+) a smoker Former,   Cardiovascular - negative cardio ROS and normal exam        Neuro/Psych  (+) numbness, psychiatric history Depression,      ROS Comment: CHIARI TYPE 1 MALFORMATION  GI/Hepatic/Renal/Endo    (+)  GERD,      Musculoskeletal     (+) back pain,   Abdominal  - normal exam    Bowel sounds: normal.   Substance History   (+) drug use (MARIJUANA)     OB/GYN negative ob/gyn ROS         Other   arthritis,    history of cancer (SKIN)                    Anesthesia Plan    ASA 2     general     intravenous induction     Anesthetic plan, risks, benefits, and alternatives have been provided, discussed and informed consent has been obtained with: patient.    Plan discussed with CRNA.        CODE STATUS:

## 2023-05-30 NOTE — INTERVAL H&P NOTE
"[facility] Pre-op    Full history and physical note from office is attached.    /88 (BP Location: Right arm, Patient Position: Lying)   Pulse 65   Temp 96.5 °F (35.8 °C) (Temporal)   Resp 16   Ht 157.5 cm (62\")   Wt 56.1 kg (123 lb 10.9 oz)   SpO2 100%   BMI 22.62 kg/m²       LAB Results:  Lab Results   Component Value Date    WBC 6.01 05/23/2023    HGB 13.8 05/23/2023    HCT 42.5 05/23/2023    MCV 92.4 05/23/2023     05/23/2023    GLUCOSE 101 (H) 05/23/2023    BUN 15 05/23/2023    CREATININE 0.77 05/23/2023     05/23/2023    K 5.1 05/23/2023     05/23/2023    CO2 30.0 (H) 05/23/2023    CALCIUM 10.6 (H) 05/23/2023    PTT 30.2 05/23/2023    INR 0.90 05/23/2023       Cancer Staging (if applicable)  Cancer Patient: __ yes __no __unknown__N/A; If yes, clinical stage T:__ N:__M:__, stage group or __N/A      Impression: Lumbosacral radiculopathy      Plan: Left L5-S1 microdiscectomy      MAHNAZ Crowley   5/30/2023   06:36 EDT   "

## 2023-05-31 VITALS
HEART RATE: 62 BPM | RESPIRATION RATE: 16 BRPM | OXYGEN SATURATION: 91 % | TEMPERATURE: 98.3 F | HEIGHT: 62 IN | WEIGHT: 123.68 LBS | SYSTOLIC BLOOD PRESSURE: 139 MMHG | BODY MASS INDEX: 22.76 KG/M2 | DIASTOLIC BLOOD PRESSURE: 90 MMHG

## 2023-05-31 RX ORDER — TRAMADOL HYDROCHLORIDE 50 MG/1
50 TABLET ORAL EVERY 6 HOURS PRN
Qty: 20 TABLET | Refills: 0 | Status: SHIPPED | OUTPATIENT
Start: 2023-05-31 | End: 2023-06-01 | Stop reason: SDUPTHER

## 2023-05-31 RX ADMIN — GABAPENTIN 300 MG: 300 CAPSULE ORAL at 18:34

## 2023-05-31 RX ADMIN — GABAPENTIN 300 MG: 300 CAPSULE ORAL at 08:53

## 2023-05-31 RX ADMIN — IBUPROFEN 200 MG: 400 TABLET ORAL at 06:56

## 2023-05-31 RX ADMIN — PANTOPRAZOLE SODIUM 40 MG: 40 TABLET, DELAYED RELEASE ORAL at 06:46

## 2023-05-31 RX ADMIN — CETIRIZINE HYDROCHLORIDE 10 MG: 10 TABLET, FILM COATED ORAL at 08:53

## 2023-05-31 RX ADMIN — TRAMADOL HYDROCHLORIDE 50 MG: 50 TABLET, COATED ORAL at 18:34

## 2023-05-31 RX ADMIN — SERTRALINE HYDROCHLORIDE 25 MG: 25 TABLET ORAL at 08:53

## 2023-05-31 RX ADMIN — Medication 3 ML: at 08:55

## 2023-05-31 NOTE — PLAN OF CARE
Problem: Adult Inpatient Plan of Care  Goal: Absence of Hospital-Acquired Illness or Injury  Intervention: Identify and Manage Fall Risk  Recent Flowsheet Documentation  Taken 5/31/2023 1400 by Roland Kovacs RN  Safety Promotion/Fall Prevention:   activity supervised   clutter free environment maintained   assistive device/personal items within reach   toileting scheduled   safety round/check completed   room organization consistent   nonskid shoes/slippers when out of bed   gait belt   fall prevention program maintained  Taken 5/31/2023 1212 by Roland Kovacs RN  Safety Promotion/Fall Prevention:   activity supervised   clutter free environment maintained   assistive device/personal items within reach   toileting scheduled   safety round/check completed   room organization consistent   nonskid shoes/slippers when out of bed   fall prevention program maintained   gait belt  Taken 5/31/2023 1015 by Roland Kovacs RN  Safety Promotion/Fall Prevention:   activity supervised   clutter free environment maintained   assistive device/personal items within reach   toileting scheduled   safety round/check completed   room organization consistent   nonskid shoes/slippers when out of bed   gait belt   fall prevention program maintained  Taken 5/31/2023 0815 by Roland Kovacs RN  Safety Promotion/Fall Prevention:   activity supervised   clutter free environment maintained   assistive device/personal items within reach   toileting scheduled   safety round/check completed   room organization consistent   nonskid shoes/slippers when out of bed   gait belt   fall prevention program maintained  Intervention: Prevent Skin Injury  Recent Flowsheet Documentation  Taken 5/31/2023 0815 by Roland Kovacs RN  Body Position: supine, legs elevated  Intervention: Prevent and Manage VTE (Venous Thromboembolism) Risk  Recent Flowsheet Documentation  Taken 5/31/2023 1015 by Roland Kovacs RN  Activity  Management: bedrest  Taken 5/31/2023 0815 by Roland Kovacs RN  Activity Management: bedrest  Goal: Optimal Comfort and Wellbeing  Intervention: Monitor Pain and Promote Comfort  Recent Flowsheet Documentation  Taken 5/31/2023 0815 by Roland Kovacs RN  Pain Management Interventions:   see MAR   position adjusted   pillow support provided  Intervention: Provide Person-Centered Care  Recent Flowsheet Documentation  Taken 5/31/2023 1600 by Roland Kovacs RN  Trust Relationship/Rapport:   care explained   reassurance provided  Taken 5/31/2023 1212 by Roland Kovacs RN  Trust Relationship/Rapport:   care explained   reassurance provided  Taken 5/31/2023 1015 by Roland Kovacs RN  Trust Relationship/Rapport:   care explained   reassurance provided  Taken 5/31/2023 0815 by Roland Kovacs RN  Trust Relationship/Rapport:   care explained   reassurance provided   Goal Outcome Evaluation:

## 2023-05-31 NOTE — PLAN OF CARE
Problem: Adult Inpatient Plan of Care  Goal: Plan of Care Review  Outcome: Ongoing, Progressing  Goal: Patient-Specific Goal (Individualized)  Outcome: Ongoing, Progressing  Goal: Absence of Hospital-Acquired Illness or Injury  Outcome: Ongoing, Progressing  Intervention: Identify and Manage Fall Risk  Recent Flowsheet Documentation  Taken 5/31/2023 0600 by Leslie Ramos RN  Safety Promotion/Fall Prevention:   assistive device/personal items within reach   clutter free environment maintained   fall prevention program maintained   safety round/check completed   room organization consistent  Taken 5/31/2023 0400 by Leslie Ramos RN  Safety Promotion/Fall Prevention:   assistive device/personal items within reach   clutter free environment maintained   fall prevention program maintained   room organization consistent   safety round/check completed  Taken 5/31/2023 0200 by Leslie Ramos RN  Safety Promotion/Fall Prevention:   assistive device/personal items within reach   clutter free environment maintained   safety round/check completed   room organization consistent   fall prevention program maintained  Taken 5/31/2023 0000 by Leslie Ramos RN  Safety Promotion/Fall Prevention:   assistive device/personal items within reach   clutter free environment maintained   safety round/check completed   room organization consistent  Taken 5/30/2023 2206 by Leslie Ramos RN  Safety Promotion/Fall Prevention:   assistive device/personal items within reach   clutter free environment maintained   nonskid shoes/slippers when out of bed   room organization consistent   safety round/check completed  Taken 5/30/2023 2000 by Leslie Ramos RN  Safety Promotion/Fall Prevention:   assistive device/personal items within reach   clutter free environment maintained   fall prevention program maintained   room organization consistent   safety round/check completed  Intervention: Prevent Skin Injury  Recent  Flowsheet Documentation  Taken 5/31/2023 0600 by Leslie Ramos RN  Body Position: position changed independently  Skin Protection:   adhesive use limited   incontinence pads utilized   transparent dressing maintained  Taken 5/31/2023 0400 by Leslie Ramos RN  Body Position: position changed independently  Skin Protection:   adhesive use limited   incontinence pads utilized   transparent dressing maintained  Taken 5/31/2023 0200 by Leslie Ramos RN  Body Position: position changed independently  Skin Protection:   adhesive use limited   incontinence pads utilized   transparent dressing maintained  Taken 5/31/2023 0000 by Leslie Ramos RN  Body Position: position changed independently  Skin Protection:   adhesive use limited   incontinence pads utilized   transparent dressing maintained  Taken 5/30/2023 2206 by Leslie Ramos RN  Body Position:   position changed independently   neutral body alignment   neutral head position  Skin Protection:   adhesive use limited   incontinence pads utilized   transparent dressing maintained  Taken 5/30/2023 2000 by Leslie Ramos RN  Body Position:   position changed independently   neutral head position   neutral body alignment  Skin Protection:   adhesive use limited   incontinence pads utilized   transparent dressing maintained  Intervention: Prevent and Manage VTE (Venous Thromboembolism) Risk  Recent Flowsheet Documentation  Taken 5/31/2023 0600 by Leslie Ramos RN  Activity Management: bedrest  VTE Prevention/Management:   bilateral   sequential compression devices on  Taken 5/31/2023 0400 by Leslie Ramos RN  Activity Management: bedrest  VTE Prevention/Management: sequential compression devices on  Taken 5/31/2023 0200 by Leslie Ramos RN  Activity Management: bedrest  VTE Prevention/Management:   bilateral   sequential compression devices on  Taken 5/31/2023 0000 by Leslie Ramos RN  Activity Management:  bedrest  VTE Prevention/Management:   bilateral   sequential compression devices on  Taken 5/30/2023 2206 by Leslie Ramos RN  VTE Prevention/Management:   bilateral   sequential compression devices on  Taken 5/30/2023 2000 by Leslie Ramos RN  Activity Management: bedrest  VTE Prevention/Management:   bilateral   sequential compression devices on  Intervention: Prevent Infection  Recent Flowsheet Documentation  Taken 5/31/2023 0400 by Leslie Ramos RN  Infection Prevention:   hand hygiene promoted   rest/sleep promoted   single patient room provided  Taken 5/31/2023 0000 by Leslie Ramos RN  Infection Prevention:   hand hygiene promoted   rest/sleep promoted   single patient room provided  Taken 5/30/2023 2000 by Leslie Ramos RN  Infection Prevention:   hand hygiene promoted   rest/sleep promoted   single patient room provided  Goal: Optimal Comfort and Wellbeing  Outcome: Ongoing, Progressing  Intervention: Monitor Pain and Promote Comfort  Recent Flowsheet Documentation  Taken 5/30/2023 2206 by Leslie Ramos RN  Pain Management Interventions: see MAR  Intervention: Provide Person-Centered Care  Recent Flowsheet Documentation  Taken 5/30/2023 2000 by Leslie Ramos RN  Trust Relationship/Rapport:   care explained   choices provided   questions answered   questions encouraged   reassurance provided   thoughts/feelings acknowledged  Goal: Readiness for Transition of Care  Outcome: Ongoing, Progressing   Goal Outcome Evaluation:

## 2023-05-31 NOTE — CASE MANAGEMENT/SOCIAL WORK
Discharge Planning Assessment  [facility]     Patient Name: Trisha Bates  MRN: [id number]  Today's Date: 5/31/2023    Admit Date: 5/30/2023    Plan: Home with Family   Discharge Needs Assessment     Row Name 05/31/23 1502       Living Environment    People in Home spouse    Name(s) of People in Home Patsy Bates -     Current Living Arrangements home    Primary Care Provided by self    Provides Primary Care For no one    Family Caregiver if Needed child(sarwat), adult    Family Caregiver Names Elizabeth Pettit- daughter    Quality of Family Relationships helpful;involved;supportive    Able to Return to Prior Arrangements yes       Transition Planning    Patient/Family Anticipates Transition to home with family    Patient/Family Anticipated Services at Transition none    Transportation Anticipated family or friend will provide       Discharge Needs Assessment    Readmission Within the Last 30 Days no previous admission in last 30 days    Equipment Currently Used at Home none    Concerns to be Addressed discharge planning    Anticipated Changes Related to Illness none               Discharge Plan     Row Name 05/31/23 1503       Plan    Plan Home with Family    Patient/Family in Agreement with Plan yes    Plan Comments I have met with Mrs. Bates at the bedside today to initiate a discharge plan.  She states that she lives in [facility] in a home with her , Patsy.  She reports that she is independent with activities of daily living and mobility.  She denies use of DME and current receipt of home health services.  Mrs. Bates anticipates no discharge needs and states that her daughter and  are available to provide assistance and transportation as needed.  She is s/p Left L5 laminotomy, left L5-S1 lateral recess decompression, left L5-S1 foraminotomy, mesial facetectomy performed yesterday by Dr. Lacey.  CM will cont to follow the plan of care and assist with discharge needs as  recommendations become available.    Final Discharge Disposition Code 01 - home or self-care              Continued Care and Services - Admitted Since 5/30/2023    Coordination has not been started for this encounter.       Expected Discharge Date and Time     Expected Discharge Date Expected Discharge Time    May 31, 2023          Demographic Summary     Row Name 05/31/23 1452       General Information    Admission Type observation    Arrived From home    Referral Source admission list    Reason for Consult discharge planning    General Information Comments I have confirmed with Mrs. Bates that her PCP is Kandy Monk MD and her insurance is [employer].       Contact Information    Permission Granted to Share Info With                Functional Status     Row Name 05/31/23 1501       Functional Status, IADL    Medications independent    Meal Preparation independent    Housekeeping independent    Laundry independent    Shopping independent       Mental Status Summary    Recent Changes in Mental Status/Cognitive Functioning no changes       Employment/    Employment Status employed full-time    Current or Previous Occupation desk job               Psychosocial    No documentation.                Abuse/Neglect    No documentation.                Legal    No documentation.                Substance Abuse    No documentation.                Patient Forms    No documentation.                   Lucretia Greer RN

## 2023-06-01 DIAGNOSIS — M54.17 LUMBOSACRAL RADICULOPATHY AT S1: ICD-10-CM

## 2023-06-01 RX ORDER — TRAMADOL HYDROCHLORIDE 50 MG/1
50 TABLET ORAL EVERY 6 HOURS PRN
Qty: 20 TABLET | Refills: 0 | Status: SHIPPED | OUTPATIENT
Start: 2023-06-01

## 2023-06-01 NOTE — TELEPHONE ENCOUNTER
Provider:  Abhijit  Surgery/Procedure:  Microdiscectomy left L5-S1  Surgery/Procedure Date:  5/30/23  Last visit:   5/12/23  Next visit: 6/14/23     Reason for call: Patient did not receive prescription of tramadol at discharge, have called [facility] outpatient pharmacy and confirmed she has not picked this medication up and have canceled the order. Patient would like it sent to [facility] in [place].    Tone:    Pat ID Date Filled RX # Drug Name Prescriber Name Dispenser Name Qty Days MED PMT Rpt To  1 04/29/2023 [id number] Gabapentin 300MG Dominguez, Marcos [facility] 78 26 04 [place]  Date Written New/Refill Dosage Form Prescriber [facility]  03/28/2023 Refill [facility]  Pat ID Date Filled RX # Drug Name Prescriber Name Dispenser Name Qty Days MED PMT Rpt To  1 03/28/2023 [id number] Gabapentin 300MG Dominguez, Marcos [facility] 90 30 04 [place]  Date Written New/Refill Dosage Form Prescriber [facility]  03/28/2023 New CAPS [facility]  Pat ID Date Filled RX # Drug Name Prescriber Name Dispenser Name Qty Days MED PMT Rpt To  1 03/16/2023 [id number] Hydrocodone Bitartrate/Ac 325MG/5MG Jayce, Griselda L [facility] 10 5 10 04 [place]  Date Written New/Refill Dosage Form Prescriber [facility]  03/16/2023 New TABS [facility]  Pat ID Date Filled RX # Drug Name Prescriber Name Dispenser Name Qty Days MED PMT Rpt To  1 03/14/2023 [id number] Triazolam 0.25MG Jayce, Griselda L [facility] 3 2 04 [place]  Date Written New/Refill Dosage Form Prescriber [facility]  03/14/2023 New TABS [facility] [place]

## 2023-06-02 ENCOUNTER — TELEPHONE (OUTPATIENT)
Dept: NEUROSURGERY | Facility: CLINIC | Age: 58
End: 2023-06-02

## 2023-06-02 DIAGNOSIS — M54.17 LUMBOSACRAL RADICULOPATHY AT S1: Primary | ICD-10-CM

## 2023-06-02 DIAGNOSIS — R11.0 NAUSEA: ICD-10-CM

## 2023-06-02 RX ORDER — ONDANSETRON 4 MG/1
4 TABLET, ORALLY DISINTEGRATING ORAL EVERY 6 HOURS PRN
Status: SHIPPED | OUTPATIENT
Start: 2023-06-02

## 2023-06-02 RX ORDER — ONDANSETRON 4 MG/1
4 TABLET, FILM COATED ORAL EVERY 6 HOURS PRN
Qty: 20 TABLET | Refills: 1 | Status: SHIPPED | OUTPATIENT
Start: 2023-06-02

## 2023-06-02 NOTE — TELEPHONE ENCOUNTER
Provider:  Abhijit  Surgery/Procedure:  Microdiscectomy Left L5-S1  Surgery/Procedure Date:  5/30/23  Last visit:   5/12/23  Next visit: 6/14/23     Reason for call: Patient is having some nausea post procedure and called to request Zofran.

## 2023-06-14 ENCOUNTER — OFFICE VISIT (OUTPATIENT)
Dept: NEUROSURGERY | Facility: CLINIC | Age: 58
End: 2023-06-14
Payer: COMMERCIAL

## 2023-06-14 VITALS
TEMPERATURE: 96.6 F | SYSTOLIC BLOOD PRESSURE: 158 MMHG | BODY MASS INDEX: 22.6 KG/M2 | WEIGHT: 122.8 LBS | DIASTOLIC BLOOD PRESSURE: 98 MMHG | HEIGHT: 62 IN

## 2023-06-14 DIAGNOSIS — Z98.890 S/P LUMBAR DISCECTOMY: Primary | ICD-10-CM

## 2023-06-14 DIAGNOSIS — M54.17 LUMBOSACRAL RADICULOPATHY AT S1: ICD-10-CM

## 2023-06-14 NOTE — PROGRESS NOTES
Subjective     Chief Complaint: Status post lumbar discectomy    Patient ID: Trisha Bates is a 58 y.o. female is here today for follow-up.    History of Present Illness    This is a 58-year-old woman in whom I performed a left L5-S1 lateral recess decompression.  She had a history of a prior right-sided L5-S1 decompression.  During surgery encountered a significant amount of scar tissue and in fact there was a small area of cloudy arachnoid which was presumably due to her prior surgery.  Out of an abundance of caution I kept her in the hospital overnight flat.  She was discharged home on the day following the procedure with no evidence of CSF hypotension and satisfactory resolution of her leg pain.  She presents today and reports that her left leg pain is significantly improved although she still does have some discomfort in her left hamstring and buttock.  She is complaining of some low back pain which is slightly worse postoperatively.    The following portions of the patient's history were reviewed and updated as appropriate: allergies, current medications, past family history, past medical history, past social history, past surgical history and problem list.    Family history:   Family History   Problem Relation Age of Onset    Hypertension Mother     Arthritis Mother     Hypertension Father     Cancer Father     Hypertension Brother     Cancer Paternal Aunt     Heart disease Maternal Grandmother     Diabetes Maternal Grandmother     Arthritis Maternal Grandmother        Social history:   Social History     Socioeconomic History    Marital status:    Tobacco Use    Smoking status: Former     Packs/day: 0.25     Types: Cigarettes     Quit date:      Years since quittin.4    Smokeless tobacco: Never   Vaping Use    Vaping Use: Never used   Substance and Sexual Activity    Alcohol use: Yes     Comment: SOCIAL    Drug use: Yes     Types: Marijuana     Comment: 1/day    Sexual activity:  Defer       Review of Systems   Constitutional:  Positive for diaphoresis and fatigue. Negative for activity change, appetite change, chills, fever and unexpected weight change.   HENT:  Negative for congestion, dental problem, drooling, ear discharge, ear pain, facial swelling, hearing loss, mouth sores, nosebleeds, postnasal drip, rhinorrhea, sinus pressure, sneezing, sore throat, tinnitus, trouble swallowing and voice change.    Eyes:  Negative for photophobia, pain, discharge, redness, itching and visual disturbance.   Respiratory:  Negative for apnea, cough, choking, chest tightness, shortness of breath, wheezing and stridor.    Cardiovascular:  Negative for chest pain, palpitations and leg swelling.   Gastrointestinal:  Positive for nausea. Negative for abdominal distention, abdominal pain, anal bleeding, blood in stool, constipation, diarrhea, rectal pain and vomiting.   Endocrine: Negative for cold intolerance, heat intolerance, polydipsia, polyphagia and polyuria.   Genitourinary:  Negative for decreased urine volume, difficulty urinating, dysuria, enuresis, flank pain, frequency, genital sores, hematuria and urgency.   Musculoskeletal:  Positive for back pain. Negative for arthralgias, gait problem, joint swelling, myalgias, neck pain and neck stiffness.   Skin:  Negative for color change, pallor, rash and wound.   Allergic/Immunologic: Positive for environmental allergies. Negative for food allergies and immunocompromised state.   Neurological:  Positive for light-headedness and headaches. Negative for dizziness, tremors, seizures, syncope, facial asymmetry, speech difficulty, weakness and numbness.   Hematological:  Negative for adenopathy. Does not bruise/bleed easily.   Psychiatric/Behavioral:  Negative for agitation, behavioral problems, confusion, decreased concentration, dysphoric mood, hallucinations, self-injury, sleep disturbance and suicidal ideas. The patient is nervous/anxious. The patient is  "not hyperactive.    All other systems reviewed and are negative.    Objective   Blood pressure 158/98, temperature 96.6 °F (35.9 °C), temperature source Infrared, height 157.5 cm (62\"), weight 55.7 kg (122 lb 12.8 oz).  Body mass index is 22.46 kg/m².    Physical Exam    Assessment & Plan     Independent Review of Radiographic Studies:      She has no new imaging for me to review.    Medical Decision Making:      She can start physical therapy in 4 weeks.  I will follow-up with her once this has been completed.  Signs and symptoms of cauda equina and lumbosacral radiculopathy were discussed with the patient.  I directed her to contact my office with new or worsening symptoms.    Diagnoses and all orders for this visit:    1. S/P lumbar discectomy (Primary)  -     Ambulatory Referral to Physical Therapy POST OP, Evaluate and treat        No follow-ups on file.           This document signed by YARED Lacey MD June 14, 2023 12:59 EDT      "

## 2023-06-16 ENCOUNTER — TELEPHONE (OUTPATIENT)
Dept: NEUROSURGERY | Facility: OTHER | Age: 58
End: 2023-06-16

## 2023-06-16 RX ORDER — TRAMADOL HYDROCHLORIDE 50 MG/1
TABLET ORAL
Qty: 20 TABLET | Refills: 0 | Status: SHIPPED | OUTPATIENT
Start: 2023-06-16

## 2023-06-16 NOTE — TELEPHONE ENCOUNTER
Provider:  Abhijit  Caller:  Automated refill request  Surgery:  microdiscectomy Left L5-S1  Surgery Date:  05/30/2023  Last visit:  Office Visit with Mina Lacey MD (06/14/2023)  Next visit: 08/25/2023  Last filled: 06/01/2023      Reason for call:         Automated refill request for Tramadol. Medication pending. Patient's next p/o appointment is on 08/25/2023.     Requested Prescriptions     Pending Prescriptions Disp Refills   • traMADol (ULTRAM) 50 MG tablet [Pharmacy Med Name: TRAMADOL 50MG TABLETS] 20 tablet      Sig: TAKE 1 TABLET BY MOUTH EVERY 6 HOURS AS NEEDED FOR MODERATE PAIN     SUSHIL:    1 06/01/2023 [id number] Tramadol Hcl 50MG [facility]cos [facility] 20 5 20 04 [place]  06/01/2023 New TABS [facility]    1 05/22/2023 [id number] Gabapentin 300MG [facility] [address] 30 04 [place]  03/28/2023 Refill [facility]    1 04/29/2023 [id number] Gabapentin 300MG [facility] [address] 26 04 [place]  03/28/2023 Refill [facility]

## 2023-06-16 NOTE — TELEPHONE ENCOUNTER
DELETE AFTER REVIEWING: Telephone encounter to be sent to the clinical pool.    Caller: JanaTrisha    Relationship: Self    Best call back number: [phone]    What form or medical record are you requesting: RELEASE BACK TO WORK FORM     Who is requesting this form or medical record from you: EMPLOYER    How would you like to receive the form or medical records (pick-up, mail, fax):     If mail, what is the address: [address] [place] [address], [facility] [address] [place] [address]  Timeframe paperwork needed: ASAP    Additional notes: PATIENT WOULD LIKE TO RETURN TO WORK AND HER EMPLOYER STATED THEY NEED SOMETHING STATING SHE CAN GO BACK TO WORK AND ON WHAT TERMS. PLEASE ADVISE. THANK YOU.

## 2023-06-19 NOTE — TELEPHONE ENCOUNTER
Provider:  Abhijit  Surgery/Procedure:  Microdiscecomy left L5-S1  Surgery/Procedure Date:  5/30/23  Last visit:   6/14/23  Next visit: 8/25/23     Reason for call: Patient is requesting a written letter releasing her to return to work. She would like to return this week for half a day of work, and then return to full time next week. 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

## 2023-08-20 DIAGNOSIS — M54.17 LUMBOSACRAL RADICULOPATHY AT S1: ICD-10-CM

## 2023-08-21 RX ORDER — GABAPENTIN 300 MG/1
300 CAPSULE ORAL 3 TIMES DAILY
Qty: 90 CAPSULE | Refills: 1 | Status: SHIPPED | OUTPATIENT
Start: 2023-08-21

## 2023-08-21 NOTE — TELEPHONE ENCOUNTER
Provider:  Abhijit  Surgery/Procedure:  Microdiscectomy left L5-S1  Surgery/Procedure Date:  5/30/23  Last visit:   6/14/23  Next visit: 8/30/23     Reason for call:  Refill request for gabapentin pending.    Tone:    Pat ID Date Filled RX # Drug Name Prescriber Name Dispenser Name Qty Days MED PMT Rpt To  1 08/12/2023 [id number] Gabapentin 300MG Dominguez, Marcos [facility] 12 4 04 [place]  Date Written New/Refill Dosage Form Prescriber [facility]  03/28/2023 Refill [facility] Aubrey  Pat ID Date Filled RX # Drug Name Prescriber Name Dispenser Name Qty Days MED PMT Rpt To  1 06/16/2023 [id number] Tramadol Hcl 50MG Chyna Regalado [facility] 20 5 20 04 [place]  Date Written New/Refill Dosage Form Prescriber [facility]  06/16/2023 New [facility] Hardy Aubrey  Pat ID Date Filled RX # Drug Name Prescriber Name Dispenser Name Qty Days MED PMT Rpt To  1 06/01/2023 [id number] Tramadol Hcl 50MG Dominguez, Marcos [facility] 20 5 20 04 [place]  Date Written New/Refill Dosage Form Prescriber [facility]  06/01/2023 New [facility] Hardy Aubrey  Pat ID Date Filled RX # Drug Name Prescriber Name Dispenser Name Qty Days MED PMT Rpt To  1 05/22/2023 [id number] Gabapentin 300MG Dominguez, Marcos [facility] 90 30 04 [place]  Date Written New/Refill Dosage Form Prescriber [facility]  03/28/2023 Refill [facility] Hardy Aubrey  Pat ID Date Filled RX # Drug Name Prescriber Name Dispenser Name Qty Days MED PMT Rpt To  1 04/29/2023 [id number] Gabapentin 300MG Dominguez, Marcos [facility] 78 26 04 [place]

## 2023-08-30 ENCOUNTER — OFFICE VISIT (OUTPATIENT)
Dept: NEUROSURGERY | Facility: CLINIC | Age: 58
End: 2023-08-30
Payer: COMMERCIAL

## 2023-08-30 VITALS
DIASTOLIC BLOOD PRESSURE: 84 MMHG | SYSTOLIC BLOOD PRESSURE: 128 MMHG | BODY MASS INDEX: 22.52 KG/M2 | WEIGHT: 122.4 LBS | HEIGHT: 62 IN

## 2023-08-30 DIAGNOSIS — M54.17 LUMBOSACRAL RADICULOPATHY AT S1: ICD-10-CM

## 2023-08-30 PROCEDURE — 99024 POSTOP FOLLOW-UP VISIT: CPT | Performed by: PHYSICIAN ASSISTANT

## 2023-08-30 RX ORDER — IMIQUIMOD 12.5 MG/.25G
CREAM TOPICAL
COMMUNITY
Start: 2023-08-29

## 2023-08-30 NOTE — PROGRESS NOTES
"Trisha Bates  1965  08/30/2023  [id number]    CC: mild left leg pain    HPI:  S/P Left L5-S1 lateral recess decompression on 5/30/23. She has done quite well but notes that the Neurontin does help with residual pain.    Past Medical History:   Diagnosis Date    Arthritis     Back pain     COVID-19     2020; 2021    Depression     GERD (gastroesophageal reflux disease)     Low back pain     Lumbosacral disc disease     Skin cancer     BASAL CELL       Allergies   Allergen Reactions    Hydrocodone-Acetaminophen GI Intolerance    Sulfa Antibiotics Rash    Wound Dressing Adhesive Rash         Current Outpatient Medications:     cetirizine (zyrTEC) 10 MG tablet, Take 1 tablet by mouth Daily., Disp: , Rfl:     cyclobenzaprine (FLEXERIL) 10 MG tablet, Take 1 tablet by mouth At Night As Needed for Muscle Spasms., Disp: , Rfl:     Doxycycline Hyclate 150 MG tablet, Take 50 mg by mouth Every Night. PER DERMATOLOGIST, Disp: , Rfl:     gabapentin (NEURONTIN) 300 MG capsule, Take 1 capsule by mouth 3 (Three) Times a Day., Disp: 90 capsule, Rfl: 1    imiquimod (ALDARA) 5 % cream, , Disp: , Rfl:     ondansetron (Zofran) 4 MG tablet, Take 1 tablet by mouth Every 6 (Six) Hours As Needed for Nausea or Vomiting., Disp: 20 tablet, Rfl: 1    ondansetron ODT (ZOFRAN-ODT) 4 MG disintegrating tablet, Place 1 tablet on the tongue Every 6 (Six) Hours As Needed for Nausea or Vomiting., Disp: , Rfl:     sertraline (ZOLOFT) 25 MG tablet, Take 1 tablet by mouth Daily., Disp: , Rfl:     omeprazole (priLOSEC) 20 MG capsule, Take 1 capsule by mouth Daily. (Patient not taking: Reported on 8/30/2023), Disp: , Rfl:     traMADol (ULTRAM) 50 MG tablet, TAKE 1 TABLET BY MOUTH EVERY 6 HOURS AS NEEDED FOR MODERATE PAIN (Patient not taking: Reported on 8/30/2023), Disp: 20 tablet, Rfl: 0    Review of Systems      PE:  /84 (BP Location: Right arm, Patient Position: Sitting, Cuff Size: Adult)   Ht 157.5 cm (62\")   Wt 55.5 kg (122 lb " 6.4 oz)   BMI 22.39 kg/m²   Heart- RRR  Lungs- no wheezing, normal expansion    Wound-healed    Neurologic Exam   Motor intact    MDM     Activities and restrictions were discussed.  Wound care was discussed with the patient.  Diagnoses and all orders for this visit:    1. Lumbosacral radiculopathy at S1     Doing well. F/u prn    Any copied data from previous notes included in the (1) HPI, (2) PE, (3) MDM and/or Assessment and Plan has been reviewed and is accurate.    Lizbeth Regalado, PAC

## 2023-09-07 RX ORDER — GABAPENTIN 300 MG/1
300 CAPSULE ORAL 2 TIMES DAILY
Qty: 60 CAPSULE | Refills: 0 | Status: SHIPPED | OUTPATIENT
Start: 2023-09-07

## 2023-09-08 DIAGNOSIS — R11.0 NAUSEA: ICD-10-CM

## 2023-09-08 DIAGNOSIS — M54.17 LUMBOSACRAL RADICULOPATHY AT S1: ICD-10-CM

## 2023-09-08 RX ORDER — ONDANSETRON 4 MG/1
TABLET, FILM COATED ORAL
Qty: 20 TABLET | Refills: 1 | OUTPATIENT
Start: 2023-09-08

## 2023-12-25 DIAGNOSIS — M54.17 LUMBOSACRAL RADICULOPATHY AT S1: ICD-10-CM

## 2023-12-26 RX ORDER — GABAPENTIN 300 MG/1
300 CAPSULE ORAL 3 TIMES DAILY
Qty: 90 CAPSULE | Refills: 3 | Status: SHIPPED | OUTPATIENT
Start: 2023-12-26

## 2023-12-26 NOTE — TELEPHONE ENCOUNTER
Requesting refill    Requested Prescriptions     Pending Prescriptions Disp Refills    gabapentin (NEURONTIN) 300 MG capsule [Pharmacy Med Name: GABAPENTIN 300MG [facility]] 90 capsule      Sig: TAKE 1 CAPSULE BY MOUTH THREE TIMES DAILY       Provider:  Dr. Lacey  Surgery/Procedure:  microdiscectomy Left L5-S1   Surgery/Procedure Date:  Surgery with Mina Lacey MD (05/30/2023)   Last visit:   Office Visit with Chyna Regalado PA-C (08/30/2023)   Next visit: [place]    2/20/2023 [id number] Gabapentin 300MG Croucher, Chyna [facility] 12 4 04 [place]  Date Written New/Refill Dosage Form Prescriber [facility]  08/21/2023 Refill [facility] [place]  Pat ID Date Filled RX # Drug Name Prescriber Name Dispenser Name Qty Days MED PMT Rpt To  1 12/12/2023 [id number] Gabapentin 300MG Croucher, Chyna [facility] 21 7 04 [place]  Date Written New/Refill Dosage Form Prescriber [facility]  08/21/2023 Refill [facility] Gaines [place]  Pat ID Date Filled RX # Drug Name Prescriber Name Dispenser Name Qty Days MED PMT Rpt To  1 11/29/2023 [id number] Gabapentin 300MG Croucher, Chyna [facility] 21 7 04 [place]  Date Written New/Refill Dosage Form Prescriber [facility]  08/21/2023 Refill [facility] [place]  Pat ID Date Filled RX # Drug Name Prescriber Name Dispenser Name Qty Days MED PMT Rpt To  1 11/17/2023 [id number] Gabapentin 300MG Croucher, Chyna [facility] 21 7 04 [place]  Date Written New/Refill Dosage Form Prescriber [facility]  08/21/2023 Refill [facility] [place]  Pat ID Date Filled RX # Drug Name Prescriber Name Dispenser Name Qty Days MED PMT Rpt To  1 11/05/2023 [id number] Gabapentin 300MG Croucher, Chyna [facility] 21 7 04 [place]

## 2024-06-02 DIAGNOSIS — M54.17 LUMBOSACRAL RADICULOPATHY AT S1: ICD-10-CM

## 2024-06-03 NOTE — TELEPHONE ENCOUNTER
Provider:  Joel  Surgery/Procedure:  Microdiscectomy left L5-S1  Surgery/Procedure Date:  5/30/23  Last visit:   8/30/23  Next visit: NA     Reason for call:  Refill request for Gabapentin pending.    Tone:    2 04/30/2024 [id number] Gabapentin 300MG Dominguez, Marcos [facility] 90.00 30 04 [place]  Refill 12/26/2023 [facility] [place]  2 03/07/2024 [id number] Gabapentin 300MG Dominguez, Marcos [facility] 90.00 30 04 [place]  Refill 12/26/2023 [facility] [place]  2 02/02/2024 [id number] Gabapentin 300MG Dominguez, Marcos [facility] 90.00 30 04 [place]  Refill 12/26/2023 [facility] [place]  2 12/26/2023 [id number] Gabapentin 300MG Dominguez, Marcos [facility] 90.00 30 04 [place]  New 12/26/2023 [facility] [place]  2 12/20/2023 [id number] Gabapentin 300MG Croucher, Chyna [facility] 12.00 4 04 [place]  Refill 08/21/2023 [facility] [place]  1 12/12/2023 [id number] Gabapentin 300MG Croucher, Chyna [facility] 21.00 7 04 [place]  Refill 08/21/2023 [facility] [place]

## 2024-06-05 RX ORDER — GABAPENTIN 300 MG/1
300 CAPSULE ORAL 2 TIMES DAILY
Qty: 60 CAPSULE | Refills: 2 | OUTPATIENT
Start: 2024-06-05

## 2024-06-05 RX ORDER — GABAPENTIN 300 MG/1
300 CAPSULE ORAL 2 TIMES DAILY
Qty: 60 CAPSULE | Refills: 2 | Status: SHIPPED | OUTPATIENT
Start: 2024-06-05

## 2024-10-31 RX ORDER — GABAPENTIN 300 MG/1
300 CAPSULE ORAL 2 TIMES DAILY
Qty: 60 CAPSULE | OUTPATIENT
Start: 2024-10-31

## 2025-01-24 DIAGNOSIS — M54.17 LUMBOSACRAL RADICULOPATHY AT S1: Primary | ICD-10-CM

## 2025-01-24 NOTE — TELEPHONE ENCOUNTER
Provider:  Collins Regalado  Surgery/Procedure:  microdiscectomy Left L5-S1   Surgery/Procedure Date:  5-30-23  Last visit:   Office Visit with Chyna Regalado PA-C (08/30/2023)   Next visit: PRN     Reason for call: Patient has requested a refill, does she need to ask her PCP or can we given her 1 last refill?  Please Advise. Thank you.    Requested Prescriptions     Pending Prescriptions Disp Refills    gabapentin (NEURONTIN) 300 MG capsule 60 capsule 2     Sig: Take 1 capsule by mouth 2 (Two) Times a Day.     SUSHIL:    1 08/04/2024 [id number] Gabapentin 300MG Croucher, Chyna [facility] 60.00 30 04 [place]  Refill 06/05/2024 [facility] [place]  1 08/02/2024 [id number] Diazepam 5MG [facility] [place] 2.00 1 99 [place]  New 08/02/2024 [facility]  1 07/02/2024 [id number] Gabapentin 300MG Croucher, Chyna [facility] 60.00 30 04 [place]  Refill 06/05/2024 [facility] [place]  1 06/05/2024 [id number] Gabapentin 300MG Croucher, Chyna [facility] 60.00 30 04 [place]  New 06/05/2024 [facility] [place]  1 04/30/2024 [id number] Gabapentin 300MG Marcos Dominguez [facility] 90.00 30 04 [place]

## 2025-01-27 RX ORDER — GABAPENTIN 300 MG/1
300 CAPSULE ORAL 2 TIMES DAILY
Qty: 60 CAPSULE | Refills: 2 | OUTPATIENT
Start: 2025-01-27

## (undated) DEVICE — HDRST INTUB GENTLETOUCH SLOT 7IN RT

## (undated) DEVICE — ADHS SKIN PREMIERPRO EXOFIN TOPICAL HI/VISC .5ML

## (undated) DEVICE — UNDERGLV SURG BIOGEL INDICAT PI SZ8.5 BLU

## (undated) DEVICE — NDL HYPO ECLPS SFTY 25G 1 1/2IN

## (undated) DEVICE — BLANKT WARM UPPR/BDY ARM/OUT 57X196CM

## (undated) DEVICE — DRSNG WND BORDR/ADHS NONADHR/GZ LF 4X4IN STRL

## (undated) DEVICE — PATIENT RETURN ELECTRODE, SINGLE-USE, CONTACT QUALITY MONITORING, ADULT, WITH 9FT CORD, FOR PATIENTS WEIGING OVER 33LBS. (15KG): Brand: MEGADYNE

## (undated) DEVICE — PENCL ROCKRSWCH MEGADYNE W/HOLSTR 10FT SS

## (undated) DEVICE — INSTRUMENT 9560704 DISP 22MMX4CM RTRCT: Brand: METRX® SYSTEM

## (undated) DEVICE — ELECTRD BLD EZ CLN MOD XLNG 2.75IN

## (undated) DEVICE — ELECTRD BLD EZ CLN MOD 6.5IN

## (undated) DEVICE — SNAP KOVER: Brand: UNBRANDED

## (undated) DEVICE — CABL BIPOL MEGADYNE 12FT DISP

## (undated) DEVICE — SYR CONTRL PRESS/LO FIX/M/LL W/THMB/RNG 10ML

## (undated) DEVICE — PK NEURO DISC 10

## (undated) DEVICE — INTENDED USE FOR SURGICAL MARKING ON INTACT SKIN, ALSO PROVIDES A PERMANENT METHOD OF IDENTIFYING OBJECTS IN THE OPERATING ROOM: Brand: WRITESITE® REGULAR TIP SKIN MARKER

## (undated) DEVICE — SPNG GZ WOVN 4X4IN 12PLY 10/BX STRL

## (undated) DEVICE — DRP MICROSCOPE 4 BINOCULAR CV 54X150IN

## (undated) DEVICE — GLV SURG BIOGEL LTX PF 8

## (undated) DEVICE — TOOL MR8-T12MH25M MR8 12CM T M 2FL 2.5MM: Brand: MIDAS REX MR8

## (undated) DEVICE — TB SXN FRAZIER 12F STRL

## (undated) DEVICE — Device

## (undated) DEVICE — TB SXN FRAZIER 8F STRL

## (undated) DEVICE — NEEDLE, QUINCKE 22GX3.5": Brand: MEDLINE INDUSTRIES, INC.

## (undated) DEVICE — STRAP POSTN KN/BDY FM 5X72IN DISP

## (undated) DEVICE — PCH INST SURG INVISISHIELD 2PCKT